# Patient Record
Sex: FEMALE | Race: WHITE | NOT HISPANIC OR LATINO | Employment: OTHER | URBAN - METROPOLITAN AREA
[De-identification: names, ages, dates, MRNs, and addresses within clinical notes are randomized per-mention and may not be internally consistent; named-entity substitution may affect disease eponyms.]

---

## 2023-12-08 ENCOUNTER — TELEPHONE (OUTPATIENT)
Dept: RHEUMATOLOGY | Facility: CLINIC | Age: 63
End: 2023-12-08
Payer: MEDICARE

## 2023-12-08 NOTE — TELEPHONE ENCOUNTER
----- Message from Diego Arriaga sent at 12/8/2023 12:43 PM CST -----  Contact: Self  Type:  Same Day Appointment Request    Caller is requesting a same day appointment.  Caller declined first available appointment listed below.      Name of Caller:  PT  When is the first available appointment?  N/a  Symptoms:  Arthritis   Best Call Back Number:  [phone]    Additional Information:

## 2024-02-08 ENCOUNTER — TELEPHONE (OUTPATIENT)
Dept: NEPHROLOGY | Facility: CLINIC | Age: 64
End: 2024-02-08
Payer: MEDICARE

## 2024-02-08 NOTE — TELEPHONE ENCOUNTER
----- Message from Chika Mello sent at 2/8/2024 11:52 AM CST -----  Contact: Self  Type:  Sooner Appointment Request    Caller is requesting a sooner appointment.  Caller declined first available appointment listed below.  Caller will not accept being placed on the waitlist and is requesting a message be sent to doctor.    Name of Caller:  Patient  When is the first available appointment?  05/06  Symptoms:  Type 1 DM, changes in renal labs- (possible CKD)  Would the patient rather a call back or a response via [facility]? Call  Best Call Back Number:  [phone]  Additional Information:  Can we please call pt back to assist, stated would like to see Dr Tsai since that is who her endocrinologist referred her to. Thank You

## 2024-03-25 ENCOUNTER — OFFICE VISIT (OUTPATIENT)
Dept: NEPHROLOGY | Facility: CLINIC | Age: 64
End: 2024-03-25
Payer: MEDICARE

## 2024-03-25 VITALS — SYSTOLIC BLOOD PRESSURE: 148 MMHG | DIASTOLIC BLOOD PRESSURE: 62 MMHG

## 2024-03-25 DIAGNOSIS — N18.2 CHRONIC KIDNEY DISEASE, STAGE II (MILD): Primary | ICD-10-CM

## 2024-03-25 DIAGNOSIS — I10 PRIMARY HYPERTENSION: ICD-10-CM

## 2024-03-25 DIAGNOSIS — E10.21 DIABETIC NEPHROPATHY ASSOCIATED WITH TYPE 1 DIABETES MELLITUS: ICD-10-CM

## 2024-03-25 PROCEDURE — 99999 PR PBB SHADOW E&M-EST. PATIENT-LVL III: CPT | Mod: PBBFAC,,, | Performed by: INTERNAL MEDICINE

## 2024-03-25 PROCEDURE — 3078F DIAST BP <80 MM HG: CPT | Mod: CPTII,S$GLB,, | Performed by: INTERNAL MEDICINE

## 2024-03-25 PROCEDURE — 1159F MED LIST DOCD IN RCRD: CPT | Mod: CPTII,S$GLB,, | Performed by: INTERNAL MEDICINE

## 2024-03-25 PROCEDURE — 3066F NEPHROPATHY DOC TX: CPT | Mod: CPTII,S$GLB,, | Performed by: INTERNAL MEDICINE

## 2024-03-25 PROCEDURE — 3077F SYST BP >= 140 MM HG: CPT | Mod: CPTII,S$GLB,, | Performed by: INTERNAL MEDICINE

## 2024-03-25 PROCEDURE — 1101F PT FALLS ASSESS-DOCD LE1/YR: CPT | Mod: CPTII,S$GLB,, | Performed by: INTERNAL MEDICINE

## 2024-03-25 PROCEDURE — 4010F ACE/ARB THERAPY RXD/TAKEN: CPT | Mod: CPTII,S$GLB,, | Performed by: INTERNAL MEDICINE

## 2024-03-25 PROCEDURE — 3288F FALL RISK ASSESSMENT DOCD: CPT | Mod: CPTII,S$GLB,, | Performed by: INTERNAL MEDICINE

## 2024-03-25 PROCEDURE — 1160F RVW MEDS BY RX/DR IN RCRD: CPT | Mod: CPTII,S$GLB,, | Performed by: INTERNAL MEDICINE

## 2024-03-25 PROCEDURE — 99204 OFFICE O/P NEW MOD 45 MIN: CPT | Mod: S$GLB,,, | Performed by: INTERNAL MEDICINE

## 2024-03-25 RX ORDER — IRBESARTAN 300 MG/1
TABLET ORAL
COMMUNITY

## 2024-03-25 RX ORDER — INSULIN PUMP CONTROLLER
EACH MISCELLANEOUS
COMMUNITY
Start: 2023-12-20

## 2024-03-25 RX ORDER — GABAPENTIN 300 MG/1
1 CAPSULE ORAL
COMMUNITY

## 2024-03-25 RX ORDER — ESTRADIOL 0.07 MG/D
FILM, EXTENDED RELEASE TRANSDERMAL
COMMUNITY

## 2024-03-25 RX ORDER — INSULIN ASPART 100 [IU]/ML
INJECTION, SOLUTION INTRAVENOUS; SUBCUTANEOUS
COMMUNITY

## 2024-03-25 RX ORDER — FLASH GLUCOSE SENSOR
KIT MISCELLANEOUS
COMMUNITY

## 2024-03-25 RX ORDER — UBIDECARENONE 30 MG
30 CAPSULE ORAL 3 TIMES DAILY
COMMUNITY

## 2024-03-25 RX ORDER — CARVEDILOL 12.5 MG/1
TABLET ORAL
COMMUNITY

## 2024-03-25 RX ORDER — LINACLOTIDE 145 UG/1
CAPSULE, GELATIN COATED ORAL
COMMUNITY

## 2024-03-25 RX ORDER — NAPROXEN SODIUM 220 MG/1
81 TABLET, FILM COATED ORAL DAILY
COMMUNITY

## 2024-03-25 RX ORDER — EVOLOCUMAB 140 MG/ML
140 INJECTION, SOLUTION SUBCUTANEOUS
COMMUNITY

## 2024-03-25 RX ORDER — GINSENG 100 MG
CAPSULE ORAL
COMMUNITY

## 2024-03-25 RX ORDER — BLOOD SUGAR DIAGNOSTIC
STRIP MISCELLANEOUS
COMMUNITY
Start: 2023-10-08

## 2024-03-25 RX ORDER — FLUCONAZOLE 150 MG/1
150 TABLET ORAL
COMMUNITY

## 2024-03-25 RX ORDER — LANOLIN ALCOHOL/MO/W.PET/CERES
100 CREAM (GRAM) TOPICAL DAILY
COMMUNITY

## 2024-03-25 RX ORDER — METOCLOPRAMIDE 10 MG/1
TABLET ORAL
COMMUNITY

## 2024-03-25 RX ORDER — LEVOTHYROXINE SODIUM 100 UG/1
100 TABLET ORAL
COMMUNITY

## 2024-03-25 RX ORDER — TROSPIUM CHLORIDE 20 MG/1
TABLET, FILM COATED ORAL
COMMUNITY

## 2024-03-25 RX ORDER — FLUTICASONE PROPIONATE 50 MCG
SPRAY, SUSPENSION (ML) NASAL
COMMUNITY

## 2024-03-25 RX ORDER — PANTOPRAZOLE SODIUM 40 MG/1
TABLET, DELAYED RELEASE ORAL
COMMUNITY
Start: 2023-04-24

## 2024-03-25 RX ORDER — ASPIRIN 325 MG
TABLET, DELAYED RELEASE (ENTERIC COATED) ORAL
COMMUNITY

## 2024-03-25 RX ORDER — ALPRAZOLAM 0.5 MG/1
0.5 TABLET ORAL DAILY PRN
COMMUNITY
Start: 2023-12-29

## 2024-03-25 NOTE — PROGRESS NOTES
"  Subjective:       Patient ID: Lydia Hutton is a 63 y.o. White female who presents for new patient evaluation for chronic renal failure.    Lydia Hutton is referred by Jamila Jang MD to be evaluated for chronic renal failure.  She has had diabetes for 45 years.  She has no uremic or urinary symptoms and is in her usual state of health.  There have been no recent illnesses, hospitalizations or procedures.  At times she has taken mobic but has not had any in 6-9 months.  She reports she had a URI 3 weeks ago.        Review of Systems   Constitutional:  Negative for chills, diaphoresis and fever.   Respiratory:  Negative for cough and shortness of breath.    Cardiovascular:  Negative for chest pain and leg swelling.   Gastrointestinal:  Positive for nausea (occasional). Negative for abdominal pain, diarrhea and vomiting.        "Slow digestion"   Genitourinary:  Negative for difficulty urinating, dysuria and hematuria.   Musculoskeletal:  Positive for arthralgias (hands). Negative for myalgias.   Neurological:  Negative for headaches.   Hematological:  Does not bruise/bleed easily.       The past medical, family and social histories were reviewed for this encounter.     No past medical history on file.  No past surgical history on file.  Social History     Socioeconomic History    Marital status: Single     Current Outpatient Medications   Medication Sig    ACCU-CHEK GUIDE TEST STRIPS Strp     ALPRAZolam (XANAX) 0.5 MG tablet Take 0.5 mg by mouth daily as needed.    aspirin 81 MG Chew Take 81 mg by mouth once daily.    blood-glucose meter (ACCU-CHEK GUIDE GLUCOSE METER MISC) In Vitro for 90 Days    carvediloL (COREG) 12.5 MG tablet Oral for 90 Days    cholecalciferol, vitamin D3, 1,250 mcg (50,000 unit) capsule TAKE 1 CAPSULE BY MOUTH ONCE A WEEK Oral for 84 Days    co-enzyme Q-10 30 mg capsule Take 30 mg by mouth 3 (three) times daily.    cranberry extract 200 mg Cap Take by mouth.    estradioL (VIVELLE-DOT) " 0.075 mg/24 hr Transdermal for 85 Days    flash glucose sensor (FREESTYLE ORALIA 2 SENSOR) Kit APPLY 1 NEW SENSOR EVERY 14 DAYS for 14 Days    fluconazole (DIFLUCAN) 150 MG Tab Take 150 mg by mouth.    fluticasone propionate (FLONASE) 50 mcg/actuation nasal spray USE 2 SPRAY(S) IN EACH NOSTRIL ONCE DAILY Nasal for 30 Days    gabapentin (NEURONTIN) 300 MG capsule 1 capsule.    insulin pump cart,cont inf,BT (OMNIPOD DASH PODS, GEN 4, SUBQ) as directed for 90 days    irbesartan (AVAPRO) 300 MG tablet 1 tablet Orally Once a day for 90 days    levothyroxine (SYNTHROID) 100 MCG tablet Take 100 mcg by mouth.    LINZESS 145 mcg Cap capsule TAKE 1 CAPSULE BY MOUTH ONCE DAILY IN THE MORNING Oral for 30 Days    metoclopramide HCl (REGLAN) 10 MG tablet Oral for 90 Days    NOVOLOG U-100 INSULIN ASPART 100 unit/mL injection inject 120 units subcutaneously via pump Injection once daily for 90 days    OMNIPOD DASH PODS, GEN 4, Crtg Inject into the skin.    pantoprazole (PROTONIX) 40 MG tablet Oral for 90 Days    REPATHA SURECLICK 140 mg/mL PnIj Inject 140 mg into the skin every 14 (fourteen) days.    thiamine (VITAMIN B-1) 100 MG tablet Take 100 mg by mouth once daily.    trospium (SANCTURA) 20 mg Tab tablet TAKE 1 TABLET BY MOUTH TWICE DAILY Oral for 90 Days     No current facility-administered medications for this visit.     BP (!) 148/62 (BP Location: Left arm, Patient Position: Sitting, BP Method: Large (Manual))     Objective:      Physical Exam  Vitals reviewed.   Constitutional:       General: She is not in acute distress.     Appearance: She is well-developed. She is obese.   HENT:      Head: Normocephalic and atraumatic.   Eyes:      General: No scleral icterus.     Conjunctiva/sclera: Conjunctivae normal.   Neck:      Vascular: No JVD.   Cardiovascular:      Rate and Rhythm: Normal rate and regular rhythm.      Heart sounds: Normal heart sounds. No murmur heard.     No friction rub. No gallop.   Pulmonary:      Effort:  "Pulmonary effort is normal. No respiratory distress.      Breath sounds: Normal breath sounds. No wheezing.   Abdominal:      General: Bowel sounds are normal. There is no distension.      Palpations: Abdomen is soft.      Tenderness: There is no abdominal tenderness.   Musculoskeletal:      Right lower leg: No edema.      Left lower leg: No edema.   Skin:     General: Skin is warm and dry.      Findings: No rash.   Neurological:      Mental Status: She is alert and oriented to person, place, and time.         Assessment:       1. Chronic kidney disease, stage II (mild)    2. Primary hypertension    3. Diabetic nephropathy associated with type 1 diabetes mellitus        No results found for: "CREATININE", "BUN", "NA", "K", "CL", "CO2"  No results found for: "PTH", "CALCIUM", "CAION", "PHOS"  No results found for: "HCT"  No results found for: "UTPCR"    Plan:   Return to clinic in 6 months.  Labs for next visit include rp pth ua uacr upc pth US.  Baseline creatinine is 0.8-1.1.  UACR is 47.  She is on an ARB currently.  We discussed her diet and weight loss.    Your blood pressure is controlled on your current medications.  Keep in mind that weight loss often improves blood pressure.  If you do diet and lose weight we will likely need to adjust your medications.        " Additional Progress Note...

## 2024-09-06 ENCOUNTER — PATIENT MESSAGE (OUTPATIENT)
Dept: NEPHROLOGY | Facility: CLINIC | Age: 64
End: 2024-09-06
Payer: MEDICARE

## 2024-09-06 ENCOUNTER — HOSPITAL ENCOUNTER (OUTPATIENT)
Dept: RADIOLOGY | Facility: HOSPITAL | Age: 64
Discharge: HOME | End: 2024-09-06
Attending: INTERNAL MEDICINE
Payer: MEDICARE

## 2024-09-06 DIAGNOSIS — N18.2 CHRONIC KIDNEY DISEASE, STAGE II (MILD): ICD-10-CM

## 2024-09-06 PROCEDURE — 76770 US EXAM ABDO BACK WALL COMP: CPT | Mod: TC,PO

## 2024-09-06 PROCEDURE — 76770 US EXAM ABDO BACK WALL COMP: CPT | Mod: 26,,, | Performed by: RADIOLOGY

## 2024-09-06 NOTE — TELEPHONE ENCOUNTER
I spoke to her by phone.    She recently saw Dr. Harmon;s NP.      She has a L renal mass concerning for neoplasm.    She also has some R hydro.

## 2024-09-09 ENCOUNTER — TELEPHONE (OUTPATIENT)
Dept: NEPHROLOGY | Facility: CLINIC | Age: 64
End: 2024-09-09
Payer: MEDICARE

## 2024-09-09 NOTE — TELEPHONE ENCOUNTER
----- Message from Modesta Juan sent at 9/9/2024 10:15 AM CDT -----  Type:  Needs Medical Advice    Who Called:  Pt    Would the patient rather a call back or a response via [facility]?  Call back    Best Call Back Number:  [phone]     Additional Information:  Pt is needing all the information sent to Dr Barrington Harmon office so they can start the next step after finding mass.   Please call back to advise. Thanks!

## 2024-09-13 DIAGNOSIS — N28.9 NON-FUNCTIONING KIDNEY: Primary | ICD-10-CM

## 2024-09-17 ENCOUNTER — HOSPITAL ENCOUNTER (OUTPATIENT)
Dept: RADIOLOGY | Facility: HOSPITAL | Age: 64
Discharge: HOME | End: 2024-09-17
Attending: UROLOGY
Payer: MEDICARE

## 2024-09-17 DIAGNOSIS — N28.9 NON-FUNCTIONING KIDNEY: ICD-10-CM

## 2024-09-17 LAB
CREAT SERPL-MCNC: 1.2 MG/DL (ref 0.5–1.4)
SAMPLE: NORMAL

## 2024-09-17 PROCEDURE — 25500020 PHARM REV CODE 255

## 2024-09-17 PROCEDURE — 74170 CT ABD WO CNTRST FLWD CNTRST: CPT | Mod: TC

## 2024-09-17 PROCEDURE — 74170 CT ABD WO CNTRST FLWD CNTRST: CPT | Mod: 26,,, | Performed by: RADIOLOGY

## 2024-09-17 RX ADMIN — IOHEXOL 100 ML: 350 INJECTION, SOLUTION INTRAVENOUS at 11:09

## 2024-09-18 ENCOUNTER — PATIENT MESSAGE (OUTPATIENT)
Dept: NEPHROLOGY | Facility: CLINIC | Age: 64
End: 2024-09-18
Payer: MEDICARE

## 2024-09-19 ENCOUNTER — TELEPHONE (OUTPATIENT)
Dept: NEPHROLOGY | Facility: CLINIC | Age: 64
End: 2024-09-19
Payer: MEDICARE

## 2024-10-07 ENCOUNTER — OFFICE VISIT (OUTPATIENT)
Dept: NEPHROLOGY | Facility: CLINIC | Age: 64
End: 2024-10-07
Payer: MEDICARE

## 2024-10-07 DIAGNOSIS — I10 PRIMARY HYPERTENSION: ICD-10-CM

## 2024-10-07 DIAGNOSIS — N18.2 CHRONIC KIDNEY DISEASE, STAGE II (MILD): Primary | ICD-10-CM

## 2024-10-07 DIAGNOSIS — E10.21 DIABETIC NEPHROPATHY ASSOCIATED WITH TYPE 1 DIABETES MELLITUS: ICD-10-CM

## 2024-10-07 PROCEDURE — 1160F RVW MEDS BY RX/DR IN RCRD: CPT | Mod: CPTII,95,, | Performed by: INTERNAL MEDICINE

## 2024-10-07 PROCEDURE — 4010F ACE/ARB THERAPY RXD/TAKEN: CPT | Mod: CPTII,95,, | Performed by: INTERNAL MEDICINE

## 2024-10-07 PROCEDURE — 1159F MED LIST DOCD IN RCRD: CPT | Mod: CPTII,95,, | Performed by: INTERNAL MEDICINE

## 2024-10-07 PROCEDURE — 3066F NEPHROPATHY DOC TX: CPT | Mod: CPTII,95,, | Performed by: INTERNAL MEDICINE

## 2024-10-07 PROCEDURE — 3061F NEG MICROALBUMINURIA REV: CPT | Mod: CPTII,95,, | Performed by: INTERNAL MEDICINE

## 2024-10-07 PROCEDURE — 99214 OFFICE O/P EST MOD 30 MIN: CPT | Mod: 95,,, | Performed by: INTERNAL MEDICINE

## 2024-10-07 NOTE — PROGRESS NOTES
"  Subjective:       Patient ID: Lydia Hutton is a 63 y.o. White female who presents for return patient evaluation for chronic renal failure.      She has surgery on Friday of this week.  She has no new symptoms.        Review of Systems   Constitutional:  Negative for chills, diaphoresis and fever.   Respiratory:  Negative for cough and shortness of breath.    Cardiovascular:  Negative for chest pain and leg swelling.   Gastrointestinal:  Positive for nausea (occasional). Negative for abdominal pain, diarrhea and vomiting.        "Slow digestion"   Genitourinary:  Positive for flank pain (L). Negative for difficulty urinating, dysuria and hematuria.   Musculoskeletal:  Positive for arthralgias (hands). Negative for myalgias.   Neurological:  Negative for headaches.   Hematological:  Does not bruise/bleed easily.       The past medical, family and social histories were reviewed for this encounter.     There were no vitals taken for this visit.    Objective:      Physical Exam  Vitals reviewed.   Constitutional:       General: She is not in acute distress.     Appearance: She is well-developed. She is obese.   HENT:      Head: Normocephalic and atraumatic.   Eyes:      General: No scleral icterus.     Conjunctiva/sclera: Conjunctivae normal.   Neck:      Vascular: No JVD.   Cardiovascular:      Rate and Rhythm: Normal rate and regular rhythm.      Heart sounds: Normal heart sounds. No murmur heard.     No friction rub. No gallop.   Pulmonary:      Effort: Pulmonary effort is normal. No respiratory distress.      Breath sounds: Normal breath sounds. No wheezing.   Abdominal:      General: Bowel sounds are normal. There is no distension.      Palpations: Abdomen is soft.      Tenderness: There is no abdominal tenderness.   Musculoskeletal:      Right lower leg: No edema.      Left lower leg: No edema.   Skin:     General: Skin is warm and dry.      Findings: No rash.   Neurological:      Mental Status: She is alert " "and oriented to person, place, and time.         Assessment:       1. Chronic kidney disease, stage II (mild)    2. Primary hypertension    3. Diabetic nephropathy associated with type 1 diabetes mellitus        Lab Results   Component Value Date    CREATININE 0.97 09/14/2024    BUN 23 09/14/2024     09/14/2024    K 4.4 09/14/2024     09/14/2024    CO2 27 09/14/2024     Lab Results   Component Value Date    CALCIUM 9.3 09/14/2024     No results found for: "HCT"  No results found for: "UTPCR"    Plan:   Return to clinic in 6 months.  Labs for next visit include rp pth uacr upc per standing orders q 3 months.  Baseline creatinine is 0.8-1.1.  UACR is 47.  She is on an ARB currently.  We discussed her diet and weight loss.    Your blood pressure is controlled on your current medications.  Keep in mind that weight loss often improves blood pressure.  If you do diet and lose weight we will likely need to adjust your medications.    Computed KFRE 2-Year unavailable. One or more values for this score either were not found within the given timeframe or did not fit some other criterion.    Computed KFRE 5-Year unavailable. One or more values for this score either were not found within the given timeframe or did not fit some other criterion.    "

## 2024-10-11 PROBLEM — N28.89 KIDNEY MASS: Status: ACTIVE | Noted: 2024-10-11

## 2025-02-13 ENCOUNTER — TELEPHONE (OUTPATIENT)
Dept: NEPHROLOGY | Facility: CLINIC | Age: 65
End: 2025-02-13
Payer: MEDICARE

## 2025-02-13 DIAGNOSIS — N18.2 CHRONIC KIDNEY DISEASE, STAGE II (MILD): Primary | ICD-10-CM

## 2025-02-13 NOTE — TELEPHONE ENCOUNTER
Called and spoke to patient to schedule an appointment. Appointment scheduled for 03/13/25 at 0840. Labs ordered per MD.    Patient had labs done at [facility] on 01/28/25. Lab results uploaded to media tab.

## 2025-02-13 NOTE — TELEPHONE ENCOUNTER
----- Message from Felicitas sent at 2/13/2025 12:57 PM CST -----  Type:  Appointment Request    Caller is requesting a  appointment.      Name of Caller:pt    When is the first available appointment?none available     Symptoms:Fatiguem anemic    Would the patient rather a call back or a response via [facility]? Call back    Best Call Back Number:[phone]    Additional Information: Pt needs post surgery appt and has lab work reports from [facility] with her.   Please call back to advise. Thanks!

## 2025-03-11 LAB
ALBUMIN SERPL-MCNC: 4.4 G/DL (ref 3.6–5.1)
ALBUMIN/CREAT UR: 44 MG/G CREAT
BUN SERPL-MCNC: 44 MG/DL (ref 7–25)
BUN/CREAT SERPL: 23 (CALC) (ref 6–22)
CALCIUM SERPL-MCNC: 9.9 MG/DL (ref 8.6–10.4)
CHLORIDE SERPL-SCNC: 102 MMOL/L (ref 98–110)
CO2 SERPL-SCNC: 28 MMOL/L (ref 20–32)
CREAT SERPL-MCNC: 1.95 MG/DL (ref 0.5–1.05)
CREAT UR-MCNC: 39 MG/DL (ref 20–275)
CREAT UR-MCNC: 39 MG/DL (ref 20–275)
EGFR: 28 ML/MIN/1.73M2
GLUCOSE SERPL-MCNC: 158 MG/DL (ref 65–99)
MICROALBUMIN UR-MCNC: 1.7 MG/DL
PHOSPHATE SERPL-MCNC: 4.6 MG/DL (ref 2.1–4.3)
POTASSIUM SERPL-SCNC: 4.9 MMOL/L (ref 3.5–5.3)
PROT UR-MCNC: 4 MG/DL (ref 5–24)
PROT/CREAT UR: 0.1 MG/MG CREAT (ref 0.02–0.18)
PROT/CREAT UR: 103 MG/G CREAT (ref 24–184)
PTH-INTACT SERPL-MCNC: 20 PG/ML (ref 16–77)
SODIUM SERPL-SCNC: 138 MMOL/L (ref 135–146)

## 2025-03-13 ENCOUNTER — OFFICE VISIT (OUTPATIENT)
Dept: NEPHROLOGY | Facility: CLINIC | Age: 65
End: 2025-03-13
Payer: MEDICARE

## 2025-03-13 VITALS
HEIGHT: 62 IN | WEIGHT: 205 LBS | BODY MASS INDEX: 37.73 KG/M2 | SYSTOLIC BLOOD PRESSURE: 130 MMHG | OXYGEN SATURATION: 94 % | HEART RATE: 83 BPM | DIASTOLIC BLOOD PRESSURE: 68 MMHG

## 2025-03-13 DIAGNOSIS — I10 PRIMARY HYPERTENSION: ICD-10-CM

## 2025-03-13 DIAGNOSIS — E66.01 SEVERE OBESITY (BMI 35.0-39.9) WITH COMORBIDITY: ICD-10-CM

## 2025-03-13 DIAGNOSIS — Z90.5 HISTORY OF NEPHRECTOMY, UNILATERAL: ICD-10-CM

## 2025-03-13 DIAGNOSIS — N18.32 STAGE 3B CHRONIC KIDNEY DISEASE: Primary | ICD-10-CM

## 2025-03-13 DIAGNOSIS — E10.21 DIABETIC NEPHROPATHY ASSOCIATED WITH TYPE 1 DIABETES MELLITUS: ICD-10-CM

## 2025-03-13 PROCEDURE — 3075F SYST BP GE 130 - 139MM HG: CPT | Mod: CPTII,S$GLB,, | Performed by: INTERNAL MEDICINE

## 2025-03-13 PROCEDURE — 3066F NEPHROPATHY DOC TX: CPT | Mod: CPTII,S$GLB,, | Performed by: INTERNAL MEDICINE

## 2025-03-13 PROCEDURE — 3008F BODY MASS INDEX DOCD: CPT | Mod: CPTII,S$GLB,, | Performed by: INTERNAL MEDICINE

## 2025-03-13 PROCEDURE — 1160F RVW MEDS BY RX/DR IN RCRD: CPT | Mod: CPTII,S$GLB,, | Performed by: INTERNAL MEDICINE

## 2025-03-13 PROCEDURE — 3078F DIAST BP <80 MM HG: CPT | Mod: CPTII,S$GLB,, | Performed by: INTERNAL MEDICINE

## 2025-03-13 PROCEDURE — 1159F MED LIST DOCD IN RCRD: CPT | Mod: CPTII,S$GLB,, | Performed by: INTERNAL MEDICINE

## 2025-03-13 PROCEDURE — 3060F POS MICROALBUMINURIA REV: CPT | Mod: CPTII,S$GLB,, | Performed by: INTERNAL MEDICINE

## 2025-03-13 PROCEDURE — 99999 PR PBB SHADOW E&M-EST. PATIENT-LVL IV: CPT | Mod: PBBFAC,,, | Performed by: INTERNAL MEDICINE

## 2025-03-13 PROCEDURE — 99214 OFFICE O/P EST MOD 30 MIN: CPT | Mod: S$GLB,,, | Performed by: INTERNAL MEDICINE

## 2025-03-13 PROCEDURE — 4010F ACE/ARB THERAPY RXD/TAKEN: CPT | Mod: CPTII,S$GLB,, | Performed by: INTERNAL MEDICINE

## 2025-03-13 RX ORDER — AMLODIPINE BESYLATE 2.5 MG/1
2.5 TABLET ORAL DAILY
COMMUNITY

## 2025-03-13 NOTE — PROGRESS NOTES
"  Subjective:       Patient ID: Lydia Hutton is a 64 y.o. White female who presents for return patient evaluation for chronic renal failure.      She had her left nephrectomy on 10/15/24.  3 weeks ago she was started on amlodipine due to uncontrolled blood pressure.  She has had a bit more fatigue since then as well.       Review of Systems   Constitutional:  Positive for fatigue. Negative for chills, diaphoresis and fever.   Respiratory:  Negative for cough and shortness of breath.    Cardiovascular:  Negative for chest pain and leg swelling.   Gastrointestinal:  Positive for nausea (occasional). Negative for abdominal pain, diarrhea and vomiting.        "Slow digestion"   Genitourinary:  Positive for flank pain (L). Negative for difficulty urinating, dysuria and hematuria.   Musculoskeletal:  Positive for arthralgias (hands). Negative for myalgias.   Neurological:  Negative for headaches.   Hematological:  Does not bruise/bleed easily.       The past medical, family and social histories were reviewed for this encounter.     /68 (BP Location: Left arm, Patient Position: Sitting)   Pulse 83   Ht 5' 2" (1.575 m)   Wt 93 kg (205 lb 0.4 oz)   SpO2 (!) 94%   BMI 37.50 kg/m²     Objective:      Physical Exam  Vitals reviewed.   Constitutional:       General: She is not in acute distress.     Appearance: She is well-developed. She is obese.   HENT:      Head: Normocephalic and atraumatic.   Eyes:      General: No scleral icterus.     Conjunctiva/sclera: Conjunctivae normal.   Neck:      Vascular: No JVD.   Cardiovascular:      Rate and Rhythm: Normal rate and regular rhythm.      Heart sounds: Normal heart sounds. No murmur heard.     No friction rub. No gallop.   Pulmonary:      Effort: Pulmonary effort is normal. No respiratory distress.      Breath sounds: Normal breath sounds. No wheezing.   Abdominal:      General: Bowel sounds are normal. There is no distension.      Palpations: Abdomen is soft.      " "Tenderness: There is no abdominal tenderness.   Musculoskeletal:      Right lower leg: Edema (1+) present.      Left lower leg: Edema (trace) present.   Skin:     General: Skin is warm and dry.      Findings: No rash.   Neurological:      Mental Status: She is alert and oriented to person, place, and time.         Assessment:       No diagnosis found.      Lab Results   Component Value Date    CREATININE 1.95 (H) 03/08/2025    BUN 44 (H) 03/08/2025     03/08/2025    K 4.9 03/08/2025     03/08/2025    CO2 28 03/08/2025     Lab Results   Component Value Date    PTH 20 03/08/2025    CALCIUM 9.9 03/08/2025     Lab Results   Component Value Date    HCT 29.5 (L) 10/12/2024     No results found for: "UTPCR"    Plan:   Return to clinic in 3 months.  Labs for next visit include rp pth uacr upc per standing orders q 3 months.  Baseline creatinine is 0.8-1.1.  Since her L nx she has been 1.9.  UACR is 47.  She is on an ARB currently.  We discussed her diet and weight loss.    Your blood pressure is controlled on your current medications.  Keep in mind that weight loss often improves blood pressure.  If you do diet and lose weight we will likely need to adjust your medications.  Her scr is higher than I would expect s/p nx but she hopefully will have some degree of compensatory hypertrophy which will drop her baseline a bit.    KFRE 2-Year: 3.2% at 3/8/2025  7:55 AM  Calculated from:  Serum Creatinine: 1.95 mg/dL at 3/8/2025  7:55 AM  Urine Albumin Creatinine Ratio: 44 mg/g creat at 3/8/2025  7:55 AM  Age: 64 years  Sex: Female at 3/8/2025  7:55 AM  Has CKD-3 to CKD-5: No    KFRE 5-Year: 9.6% at 3/8/2025  7:55 AM  Calculated from:  Serum Creatinine: 1.95 mg/dL at 3/8/2025  7:55 AM  Urine Albumin Creatinine Ratio: 44 mg/g creat at 3/8/2025  7:55 AM  Age: 64 years  Sex: Female at 3/8/2025  7:55 AM  Has CKD-3 to CKD-5: No    "

## 2025-05-08 ENCOUNTER — TELEPHONE (OUTPATIENT)
Dept: NEPHROLOGY | Facility: CLINIC | Age: 65
End: 2025-05-08
Payer: MEDICARE

## 2025-05-08 NOTE — TELEPHONE ENCOUNTER
----- Message from Elaine sent at 5/8/2025  3:44 PM CDT -----  Contact: PT  Type:  Needs Medical AdviceWho Called: PT Symptoms (please be specific): PT HAS A ANGIOGRAM RAMBO FOR NEXT WEEK. PLEASE CALL PT TO DISCUSS  How long has patient had these symptoms:  N/APharmacy name and phone #:  N/AWould the patient rather a call back or a response via [facility]? CALL Best Call Back Number: [phone] Information: THANK YOU

## 2025-05-09 NOTE — TELEPHONE ENCOUNTER
Returned call to patient. Patient is having an angiogram done on 05/16/25 by Dr. Artur Ahuja. Patient reports Dr. Ahuja already ordered extra fluids for before and after procedure, is going to use less than 1 cc of dye, is checking creatinine levels prior to procedure and is using an alternative dye. Patient reports she had a recent lab panel done that shows eGFR 30 and creatinine 1.5. Patient requests MD review everything prior to her procedure to advise further. Informed patient MD would be notified. Patient verbalized understanding.

## 2025-07-14 ENCOUNTER — OFFICE VISIT (OUTPATIENT)
Dept: NEPHROLOGY | Facility: CLINIC | Age: 65
End: 2025-07-14
Payer: MEDICARE

## 2025-07-14 VITALS
BODY MASS INDEX: 37.5 KG/M2 | OXYGEN SATURATION: 95 % | HEART RATE: 80 BPM | SYSTOLIC BLOOD PRESSURE: 142 MMHG | DIASTOLIC BLOOD PRESSURE: 72 MMHG | HEIGHT: 62 IN

## 2025-07-14 DIAGNOSIS — I10 PRIMARY HYPERTENSION: ICD-10-CM

## 2025-07-14 DIAGNOSIS — Z90.5 HISTORY OF NEPHRECTOMY, UNILATERAL: ICD-10-CM

## 2025-07-14 DIAGNOSIS — N18.32 STAGE 3B CHRONIC KIDNEY DISEASE: Primary | ICD-10-CM

## 2025-07-14 DIAGNOSIS — E66.01 SEVERE OBESITY (BMI 35.0-39.9) WITH COMORBIDITY: ICD-10-CM

## 2025-07-14 DIAGNOSIS — E10.21 DIABETIC NEPHROPATHY ASSOCIATED WITH TYPE 1 DIABETES MELLITUS: ICD-10-CM

## 2025-07-14 PROCEDURE — 1159F MED LIST DOCD IN RCRD: CPT | Mod: CPTII,S$GLB,, | Performed by: INTERNAL MEDICINE

## 2025-07-14 PROCEDURE — 3060F POS MICROALBUMINURIA REV: CPT | Mod: CPTII,S$GLB,, | Performed by: INTERNAL MEDICINE

## 2025-07-14 PROCEDURE — 1160F RVW MEDS BY RX/DR IN RCRD: CPT | Mod: CPTII,S$GLB,, | Performed by: INTERNAL MEDICINE

## 2025-07-14 PROCEDURE — 99999 PR PBB SHADOW E&M-EST. PATIENT-LVL IV: CPT | Mod: PBBFAC,,, | Performed by: INTERNAL MEDICINE

## 2025-07-14 PROCEDURE — 4010F ACE/ARB THERAPY RXD/TAKEN: CPT | Mod: CPTII,S$GLB,, | Performed by: INTERNAL MEDICINE

## 2025-07-14 PROCEDURE — 3078F DIAST BP <80 MM HG: CPT | Mod: CPTII,S$GLB,, | Performed by: INTERNAL MEDICINE

## 2025-07-14 PROCEDURE — 3066F NEPHROPATHY DOC TX: CPT | Mod: CPTII,S$GLB,, | Performed by: INTERNAL MEDICINE

## 2025-07-14 PROCEDURE — 3077F SYST BP >= 140 MM HG: CPT | Mod: CPTII,S$GLB,, | Performed by: INTERNAL MEDICINE

## 2025-07-14 PROCEDURE — 99214 OFFICE O/P EST MOD 30 MIN: CPT | Mod: S$GLB,,, | Performed by: INTERNAL MEDICINE

## 2025-07-14 PROCEDURE — 3008F BODY MASS INDEX DOCD: CPT | Mod: CPTII,S$GLB,, | Performed by: INTERNAL MEDICINE

## 2025-07-14 NOTE — PROGRESS NOTES
"  Subjective:       Patient ID: Lydia Hutton is a 64 y.o. White female who presents for return patient evaluation for chronic renal failure.      She had her left nephrectomy on 10/15/24.  She had a LHC since last time but did ok.   Blood pressure at home 110-120/60s.      Review of Systems   Constitutional:  Positive for fatigue. Negative for chills, diaphoresis and fever.   Respiratory:  Negative for cough and shortness of breath.    Cardiovascular:  Negative for chest pain and leg swelling.   Gastrointestinal:  Positive for nausea (occasional). Negative for abdominal pain, diarrhea and vomiting.        "Slow digestion"   Genitourinary:  Negative for difficulty urinating, dysuria, flank pain and hematuria.   Musculoskeletal:  Positive for arthralgias (hands). Negative for myalgias.   Neurological:  Negative for headaches.   Hematological:  Does not bruise/bleed easily.       The past medical, family and social histories were reviewed for this encounter.     BP (!) 142/72 (BP Location: Left arm, Patient Position: Sitting)   Pulse 80   Ht 5' 2" (1.575 m)   SpO2 95%   BMI 37.50 kg/m²     Objective:      Physical Exam  Vitals reviewed.   Constitutional:       General: She is not in acute distress.     Appearance: She is well-developed. She is obese.   HENT:      Head: Normocephalic and atraumatic.   Eyes:      General: No scleral icterus.     Conjunctiva/sclera: Conjunctivae normal.   Neck:      Vascular: No JVD.   Cardiovascular:      Rate and Rhythm: Normal rate and regular rhythm.      Heart sounds: Normal heart sounds. No murmur heard.     No friction rub. No gallop.   Pulmonary:      Effort: Pulmonary effort is normal. No respiratory distress.      Breath sounds: Normal breath sounds. No wheezing.   Abdominal:      General: Bowel sounds are normal. There is no distension.      Palpations: Abdomen is soft.      Tenderness: There is no abdominal tenderness.   Musculoskeletal:      Right lower leg: Edema " "(trace) present.      Left lower leg: Edema (trace-1+) present.   Skin:     General: Skin is warm and dry.      Findings: No rash.   Neurological:      Mental Status: She is alert and oriented to person, place, and time.         Assessment:       1. Stage 3b chronic kidney disease    2. Primary hypertension    3. Diabetic nephropathy associated with type 1 diabetes mellitus    4. History of nephrectomy, unilateral    5. Severe obesity (BMI 35.0-39.9) with comorbidity        Lab Results   Component Value Date    CREATININE 1.74 (H) 07/05/2025    BUN 34 (H) 07/05/2025     (L) 07/05/2025    K 4.6 07/05/2025     07/05/2025    CO2 27 07/05/2025     Lab Results   Component Value Date    PTH 31 07/05/2025    CALCIUM 9.7 07/05/2025     Lab Results   Component Value Date    HCT 29.5 (L) 10/12/2024     No results found for: "UTPCR"    Plan:   Return to clinic in 6 months.  Labs for next visit include rp pth uacr upc per standing orders q 3 months.  Baseline creatinine is 0.8-1.1.  Since her L nx she has been 1.7-1.9.  PTH is 31 with a calcium of 9.7.  She is on an ARB currently.  We discussed her diet and weight loss.    Your blood pressure is controlled on your current medications.  Keep in mind that weight loss often improves blood pressure.  If you do diet and lose weight we will likely need to adjust your medications.  She is having some degree of compensatory hypertrophy which will drop her baseline a bit.    KFRE 2-Year: 2.1% at 7/5/2025  8:04 AM  Calculated from:  Serum Creatinine: 1.74 mg/dL at 7/5/2025  8:04 AM  Urine Albumin Creatinine Ratio: 44 mg/g creat at 3/8/2025  7:55 AM  Age: 64 years  Sex: Female at 7/5/2025  8:04 AM  Has CKD-3 to CKD-5: Yes    KFRE 5-Year: 6.3% at 7/5/2025  8:04 AM  Calculated from:  Serum Creatinine: 1.74 mg/dL at 7/5/2025  8:04 AM  Urine Albumin Creatinine Ratio: 44 mg/g creat at 3/8/2025  7:55 AM  Age: 64 years  Sex: Female at 7/5/2025  8:04 AM  Has CKD-3 to CKD-5: Yes    "